# Patient Record
Sex: MALE | Race: WHITE | Employment: FULL TIME | URBAN - METROPOLITAN AREA
[De-identification: names, ages, dates, MRNs, and addresses within clinical notes are randomized per-mention and may not be internally consistent; named-entity substitution may affect disease eponyms.]

---

## 2023-01-14 ENCOUNTER — HOSPITAL ENCOUNTER (OUTPATIENT)
Age: 68
Setting detail: OBSERVATION
Discharge: HOME | DRG: 071 | End: 2023-01-16
Attending: EMERGENCY MEDICINE | Admitting: FAMILY MEDICINE
Payer: COMMERCIAL

## 2023-01-14 ENCOUNTER — APPOINTMENT (OUTPATIENT)
Dept: CT IMAGING | Age: 68
DRG: 071 | End: 2023-01-14
Attending: EMERGENCY MEDICINE
Payer: COMMERCIAL

## 2023-01-14 DIAGNOSIS — R40.4 TRANSIENT ALTERATION OF AWARENESS: Primary | ICD-10-CM

## 2023-01-14 DIAGNOSIS — I48.3 TYPICAL ATRIAL FLUTTER (HCC): ICD-10-CM

## 2023-01-14 PROBLEM — I48.92 ATRIAL FLUTTER (HCC): Status: ACTIVE | Noted: 2023-01-14

## 2023-01-14 LAB
ALBUMIN SERPL-MCNC: 4.2 G/DL (ref 3.5–5)
ALBUMIN/GLOB SERPL: 1.2 (ref 1.1–2.2)
ALP SERPL-CCNC: 89 U/L (ref 45–117)
ALT SERPL-CCNC: 105 U/L (ref 12–78)
ANION GAP SERPL CALC-SCNC: 7 MMOL/L (ref 5–15)
AST SERPL-CCNC: 37 U/L (ref 15–37)
BASOPHILS # BLD: 0 K/UL (ref 0–0.1)
BASOPHILS NFR BLD: 0 % (ref 0–1)
BILIRUB SERPL-MCNC: 0.6 MG/DL (ref 0.2–1)
BUN SERPL-MCNC: 18 MG/DL (ref 6–20)
BUN/CREAT SERPL: 16 (ref 12–20)
CALCIUM SERPL-MCNC: 8.9 MG/DL (ref 8.5–10.1)
CHLORIDE SERPL-SCNC: 105 MMOL/L (ref 97–108)
CO2 SERPL-SCNC: 29 MMOL/L (ref 21–32)
CREAT SERPL-MCNC: 1.16 MG/DL (ref 0.7–1.3)
DIFFERENTIAL METHOD BLD: ABNORMAL
EOSINOPHIL # BLD: 0.2 K/UL (ref 0–0.4)
EOSINOPHIL NFR BLD: 2 % (ref 0–7)
ERYTHROCYTE [DISTWIDTH] IN BLOOD BY AUTOMATED COUNT: 12.3 % (ref 11.5–14.5)
GLOBULIN SER CALC-MCNC: 3.5 G/DL (ref 2–4)
GLUCOSE BLD STRIP.AUTO-MCNC: 97 MG/DL (ref 65–117)
GLUCOSE SERPL-MCNC: 103 MG/DL (ref 65–100)
HCT VFR BLD AUTO: 42.5 % (ref 36.6–50.3)
HGB BLD-MCNC: 14.3 G/DL (ref 12.1–17)
IMM GRANULOCYTES # BLD AUTO: 0 K/UL (ref 0–0.04)
IMM GRANULOCYTES NFR BLD AUTO: 0 % (ref 0–0.5)
INR PPP: 1.1 (ref 0.9–1.1)
LYMPHOCYTES # BLD: 1.7 K/UL (ref 0.8–3.5)
LYMPHOCYTES NFR BLD: 23 % (ref 12–49)
MCH RBC QN AUTO: 30.4 PG (ref 26–34)
MCHC RBC AUTO-ENTMCNC: 33.6 G/DL (ref 30–36.5)
MCV RBC AUTO: 90.2 FL (ref 80–99)
MONOCYTES # BLD: 0.6 K/UL (ref 0–1)
MONOCYTES NFR BLD: 8 % (ref 5–13)
NEUTS SEG # BLD: 4.7 K/UL (ref 1.8–8)
NEUTS SEG NFR BLD: 66 % (ref 32–75)
NRBC # BLD: 0 K/UL (ref 0–0.01)
NRBC BLD-RTO: 0 PER 100 WBC
PLATELET # BLD AUTO: 72 K/UL (ref 150–400)
PMV BLD AUTO: 11.2 FL (ref 8.9–12.9)
POTASSIUM SERPL-SCNC: 4.1 MMOL/L (ref 3.5–5.1)
PROT SERPL-MCNC: 7.7 G/DL (ref 6.4–8.2)
PROTHROMBIN TIME: 10.4 SEC (ref 9–11.1)
RBC # BLD AUTO: 4.71 M/UL (ref 4.1–5.7)
SERVICE CMNT-IMP: NORMAL
SODIUM SERPL-SCNC: 141 MMOL/L (ref 136–145)
WBC # BLD AUTO: 7.2 K/UL (ref 4.1–11.1)

## 2023-01-14 PROCEDURE — 85025 COMPLETE CBC W/AUTO DIFF WBC: CPT

## 2023-01-14 PROCEDURE — 94762 N-INVAS EAR/PLS OXIMTRY CONT: CPT

## 2023-01-14 PROCEDURE — 85610 PROTHROMBIN TIME: CPT

## 2023-01-14 PROCEDURE — 70498 CT ANGIOGRAPHY NECK: CPT

## 2023-01-14 PROCEDURE — 70450 CT HEAD/BRAIN W/O DYE: CPT

## 2023-01-14 PROCEDURE — 65270000046 HC RM TELEMETRY

## 2023-01-14 PROCEDURE — 99285 EMERGENCY DEPT VISIT HI MDM: CPT

## 2023-01-14 PROCEDURE — 93005 ELECTROCARDIOGRAM TRACING: CPT

## 2023-01-14 PROCEDURE — 82962 GLUCOSE BLOOD TEST: CPT

## 2023-01-14 PROCEDURE — 80053 COMPREHEN METABOLIC PANEL: CPT

## 2023-01-14 PROCEDURE — 74011000636 HC RX REV CODE- 636: Performed by: EMERGENCY MEDICINE

## 2023-01-14 PROCEDURE — G0378 HOSPITAL OBSERVATION PER HR: HCPCS

## 2023-01-14 PROCEDURE — 36415 COLL VENOUS BLD VENIPUNCTURE: CPT

## 2023-01-14 RX ORDER — LEVOTHYROXINE SODIUM 175 UG/1
175 TABLET ORAL DAILY
COMMUNITY
Start: 2022-12-11

## 2023-01-14 RX ADMIN — IOPAMIDOL 100 ML: 755 INJECTION, SOLUTION INTRAVENOUS at 19:00

## 2023-01-14 NOTE — ED TRIAGE NOTES
Patient arrives ambulatory with c/o altered mental status that started about 40 minutes ago after intercourse. Patient went to take a shower after and was supposed to go out with his wife and was asking wife after the shower why she was dressed up. Patient A&O x3 (person, place, situation) did not know the month or year, but knew the president.

## 2023-01-14 NOTE — ED PROVIDER NOTES
57-year-old male with PMHx of hypothyroidism presents emergency department with wife for evaluation of sudden onset confusion 45 minutes prior to arrival.  Patient's wife notes that patient is cognitively normal at baseline and approximately 10 minutes after having intercourse 1 hour ago, he began acting very confused, with short-term memory loss, not knowing basic information like his birthday or that they had just discussed meeting up with her friends for dinner tonight. She feels that his speech is slow and that he is having some word finding difficulty. They note that they drink socially and had 3-4 drinks last night but nothing today. They deny any other illicit substance use. The history is provided by the patient and the spouse. Altered mental status   Associated symptoms include confusion. Functional status baseline:  [EPIC#1537^NOTE}      History reviewed. No pertinent past medical history. History reviewed. No pertinent surgical history. History reviewed. No pertinent family history.     Social History     Socioeconomic History    Marital status: Not on file     Spouse name: Not on file    Number of children: Not on file    Years of education: Not on file    Highest education level: Not on file   Occupational History    Not on file   Tobacco Use    Smoking status: Never     Passive exposure: Never    Smokeless tobacco: Never   Vaping Use    Vaping Use: Never used   Substance and Sexual Activity    Alcohol use: Yes    Drug use: Never    Sexual activity: Yes     Partners: Female   Other Topics Concern    Not on file   Social History Narrative    Not on file     Social Determinants of Health     Financial Resource Strain: Not on file   Food Insecurity: Not on file   Transportation Needs: Not on file   Physical Activity: Not on file   Stress: Not on file   Social Connections: Not on file   Intimate Partner Violence: Not on file   Housing Stability: Not on file         ALLERGIES: Patient has no known allergies. Review of Systems   Constitutional: Negative. HENT: Negative. Eyes: Negative. Respiratory: Negative. Cardiovascular: Negative. Gastrointestinal: Negative. Endocrine: Negative. Genitourinary: Negative. Musculoskeletal: Negative. Skin: Negative. Neurological: Negative. Psychiatric/Behavioral:  Positive for confusion. Vitals:    01/14/23 1831   BP: (!) 180/103   Pulse: 95   Resp: 16   SpO2: 98%   Weight: 104.5 kg (230 lb 6.1 oz)   Height: 6' 4\" (1.93 m)            Physical Exam  Vitals and nursing note reviewed. Constitutional:       General: He is not in acute distress. Appearance: Normal appearance. He is not ill-appearing. HENT:      Head: Normocephalic and atraumatic. Nose: Nose normal.      Mouth/Throat:      Mouth: Mucous membranes are moist.   Eyes:      General: No visual field deficit. Extraocular Movements: Extraocular movements intact. Pupils: Pupils are equal, round, and reactive to light. Cardiovascular:      Rate and Rhythm: Normal rate. Rhythm irregular. Pulses: Normal pulses. Pulmonary:      Effort: Pulmonary effort is normal. No respiratory distress. Breath sounds: Normal breath sounds. Abdominal:      General: There is no distension. Palpations: Abdomen is soft. Tenderness: There is no abdominal tenderness. Musculoskeletal:         General: Normal range of motion. Cervical back: Normal range of motion. Skin:     General: Skin is warm and dry. Neurological:      General: No focal deficit present. Mental Status: He is alert. He is disoriented. GCS: GCS eye subscore is 4. GCS verbal subscore is 4. GCS motor subscore is 6. Cranial Nerves: No cranial nerve deficit or facial asymmetry. Sensory: No sensory deficit. Motor: No weakness. Comments: Notable findings from neurologic exam include mild speech delay and possible word finding difficulty.   No dysarthria appreciated, patient is slow to answer basic questions. Additionally he is disoriented to the month, although he knows the year, in addition to person and place. Psychiatric:         Mood and Affect: Mood normal.        Medical Decision Making  DDx: CVA, TIA, hypertensive emergency, infection, sepsis, UTI, pneumonia, viral illness, intracranial hemorrhage, electrolyte abnormality, toxic ingestion, withdrawal, ACS, hyperammonia    Patient's presentation is concerning given his normal neurologic baseline and acute change in mentation. Additionally EKG done immediately upon arrival demonstrates a flutter, which patient is not known to have had in the past. Also his blood pressure is  hypertensive encephalopathy is possible. Given the word finding difficulty and the acuity of his confusion, CVA is under consideration. A level 1 code stroke was called. We will pursue a broad work-up for altered mental status. Plan:  - Labs: CBC, CMP, troponin, UA, ammonia  - Imaging: CTH, CXR  - Consults: Teleneurology    EKG: This EKG was interpreted by me at 1840. Regularly irregular rhythm consistent with atrial flutter and a generally 3:1 conduction pattern. Normal axis. Some ST elevations are present in V3 through V6, though not meeting STEMI criteria    Reassessment: Patient's work-up is thus far notable for the new a flutter but otherwise reassuring blood work and imaging. Spoke with the telemetry neurologist who had interviewed patient approximately 15 minutes after me and she states that at this time his mental status and word finding seemed completely normal.  I spoke with the patient and his wife shortly thereafter and agree that his confusion appears to have resolved. The neurologist recommends admission to make sure that his confusion does not return and for an MRI. I would also like to admit for work-up of his new a flutter.   Etiology of his confusion remains unclear, however hypertensive encephalopathy may have contributed. Repeat pressures have improved. Will admit to the hospitalist service at [address] [place] [address] for Admission  7:57 PM    ED Room Number: SER02/02  Patient Name and age:  Marco Villeda 75 y.o.  male  Working Diagnosis: Confusion, New atrial flutter    COVID-19 Suspicion:  no  Sepsis present:  no  Reassessment needed: no  Code Status:  Full Code  Readmission: no  Isolation Requirements:  no  Recommended Level of Care:  telemetry  Department:[facility] Adult ED - 21   Other:  59-year-old male with PMHx of hypothyroidism presents emergency department with wife for evaluation of sudden onset confusion 45 minutes prior to arrival.  Patient's wife notes that patient is cognitively normal at baseline and approximately 10 minutes after having intercourse 1 hour ago, he began acting very confused, with short-term memory loss, not knowing basic information like his birthday or that they had just discussed meeting up with her friends for dinner tonight. She feels that his speech is slow and that he is having some word finding difficulty. Seen by telemetry neurologist, who would like admitted for AMS work-up, including MRI. Also found to have new atrial flutter in the 80s. Onset of this is unclear, will elect not to cardiovert. Would like to admit for completion of AMS work-up and cardiac monitoring for new a flutter. Amount and/or Complexity of Data Reviewed  Independent Historian: spouse  Labs: ordered. Radiology: ordered. ECG/medicine tests: ordered and independent interpretation performed. Decision-making details documented in ED Course. Risk  Prescription drug management. Decision regarding hospitalization.            Procedures

## 2023-01-15 ENCOUNTER — APPOINTMENT (OUTPATIENT)
Dept: MRI IMAGING | Age: 68
DRG: 071 | End: 2023-01-15
Attending: FAMILY MEDICINE
Payer: COMMERCIAL

## 2023-01-15 LAB
AMPHET UR QL SCN: NEGATIVE
ANION GAP SERPL CALC-SCNC: 4 MMOL/L (ref 5–15)
ATRIAL RATE: 277 BPM
BARBITURATES UR QL SCN: NEGATIVE
BASOPHILS # BLD: 0.1 K/UL (ref 0–0.1)
BASOPHILS NFR BLD: 1 % (ref 0–1)
BENZODIAZ UR QL: NEGATIVE
BUN SERPL-MCNC: 15 MG/DL (ref 6–20)
BUN/CREAT SERPL: 13 (ref 12–20)
CALCIUM SERPL-MCNC: 8.6 MG/DL (ref 8.5–10.1)
CALCULATED P AXIS, ECG09: -94 DEGREES
CALCULATED R AXIS, ECG10: 48 DEGREES
CALCULATED T AXIS, ECG11: 19 DEGREES
CANNABINOIDS UR QL SCN: NEGATIVE
CHLORIDE SERPL-SCNC: 106 MMOL/L (ref 97–108)
CO2 SERPL-SCNC: 30 MMOL/L (ref 21–32)
COCAINE UR QL SCN: NEGATIVE
CREAT SERPL-MCNC: 1.15 MG/DL (ref 0.7–1.3)
DIAGNOSIS, 93000: NORMAL
DIFFERENTIAL METHOD BLD: ABNORMAL
DRUG SCRN COMMENT,DRGCM: NORMAL
EOSINOPHIL # BLD: 0.2 K/UL (ref 0–0.4)
EOSINOPHIL NFR BLD: 3 % (ref 0–7)
ERYTHROCYTE [DISTWIDTH] IN BLOOD BY AUTOMATED COUNT: 12.3 % (ref 11.5–14.5)
EST. AVERAGE GLUCOSE BLD GHB EST-MCNC: 111 MG/DL
ETHANOL SERPL-MCNC: <10 MG/DL
GLUCOSE SERPL-MCNC: 106 MG/DL (ref 65–100)
HBA1C MFR BLD: 5.5 % (ref 4–5.6)
HCT VFR BLD AUTO: 44.2 % (ref 36.6–50.3)
HGB BLD-MCNC: 14.3 G/DL (ref 12.1–17)
IMM GRANULOCYTES # BLD AUTO: 0 K/UL (ref 0–0.04)
IMM GRANULOCYTES NFR BLD AUTO: 0 % (ref 0–0.5)
LYMPHOCYTES # BLD: 1.5 K/UL (ref 0.8–3.5)
LYMPHOCYTES NFR BLD: 27 % (ref 12–49)
MAGNESIUM SERPL-MCNC: 2.2 MG/DL (ref 1.6–2.4)
MCH RBC QN AUTO: 29.7 PG (ref 26–34)
MCHC RBC AUTO-ENTMCNC: 32.4 G/DL (ref 30–36.5)
MCV RBC AUTO: 91.7 FL (ref 80–99)
METHADONE UR QL: NEGATIVE
MONOCYTES # BLD: 0.6 K/UL (ref 0–1)
MONOCYTES NFR BLD: 11 % (ref 5–13)
NEUTS SEG # BLD: 3.1 K/UL (ref 1.8–8)
NEUTS SEG NFR BLD: 58 % (ref 32–75)
NRBC # BLD: 0 K/UL (ref 0–0.01)
NRBC BLD-RTO: 0 PER 100 WBC
OPIATES UR QL: NEGATIVE
PCP UR QL: NEGATIVE
PLATELET # BLD AUTO: 68 K/UL (ref 150–400)
PMV BLD AUTO: 10.8 FL (ref 8.9–12.9)
POTASSIUM SERPL-SCNC: 4.5 MMOL/L (ref 3.5–5.1)
Q-T INTERVAL, ECG07: 394 MS
QRS DURATION, ECG06: 92 MS
QTC CALCULATION (BEZET), ECG08: 462 MS
RBC # BLD AUTO: 4.82 M/UL (ref 4.1–5.7)
RBC MORPH BLD: ABNORMAL
SODIUM SERPL-SCNC: 140 MMOL/L (ref 136–145)
T4 FREE SERPL-MCNC: 1.2 NG/DL (ref 0.8–1.5)
TSH SERPL DL<=0.05 MIU/L-ACNC: 0.23 UIU/ML (ref 0.36–3.74)
VENTRICULAR RATE, ECG03: 83 BPM
WBC # BLD AUTO: 5.5 K/UL (ref 4.1–11.1)

## 2023-01-15 PROCEDURE — 96374 THER/PROPH/DIAG INJ IV PUSH: CPT

## 2023-01-15 PROCEDURE — 85025 COMPLETE CBC W/AUTO DIFF WBC: CPT

## 2023-01-15 PROCEDURE — 74011000250 HC RX REV CODE- 250: Performed by: FAMILY MEDICINE

## 2023-01-15 PROCEDURE — 83036 HEMOGLOBIN GLYCOSYLATED A1C: CPT

## 2023-01-15 PROCEDURE — 74011000258 HC RX REV CODE- 258: Performed by: FAMILY MEDICINE

## 2023-01-15 PROCEDURE — 93005 ELECTROCARDIOGRAM TRACING: CPT

## 2023-01-15 PROCEDURE — 70551 MRI BRAIN STEM W/O DYE: CPT

## 2023-01-15 PROCEDURE — 74011250637 HC RX REV CODE- 250/637: Performed by: STUDENT IN AN ORGANIZED HEALTH CARE EDUCATION/TRAINING PROGRAM

## 2023-01-15 PROCEDURE — 83735 ASSAY OF MAGNESIUM: CPT

## 2023-01-15 PROCEDURE — 82077 ASSAY SPEC XCP UR&BREATH IA: CPT

## 2023-01-15 PROCEDURE — 80307 DRUG TEST PRSMV CHEM ANLYZR: CPT

## 2023-01-15 PROCEDURE — 84443 ASSAY THYROID STIM HORMONE: CPT

## 2023-01-15 PROCEDURE — G0378 HOSPITAL OBSERVATION PER HR: HCPCS

## 2023-01-15 PROCEDURE — 80048 BASIC METABOLIC PNL TOTAL CA: CPT

## 2023-01-15 PROCEDURE — 74011250636 HC RX REV CODE- 250/636: Performed by: FAMILY MEDICINE

## 2023-01-15 PROCEDURE — 84480 ASSAY TRIIODOTHYRONINE (T3): CPT

## 2023-01-15 PROCEDURE — 36415 COLL VENOUS BLD VENIPUNCTURE: CPT

## 2023-01-15 PROCEDURE — 84439 ASSAY OF FREE THYROXINE: CPT

## 2023-01-15 PROCEDURE — 65270000046 HC RM TELEMETRY

## 2023-01-15 RX ORDER — ENOXAPARIN SODIUM 100 MG/ML
40 INJECTION SUBCUTANEOUS DAILY
Status: DISCONTINUED | OUTPATIENT
Start: 2023-01-15 | End: 2023-01-15

## 2023-01-15 RX ORDER — ONDANSETRON 4 MG/1
4 TABLET, ORALLY DISINTEGRATING ORAL
Status: DISCONTINUED | OUTPATIENT
Start: 2023-01-15 | End: 2023-01-16 | Stop reason: HOSPADM

## 2023-01-15 RX ORDER — SODIUM CHLORIDE 0.9 % (FLUSH) 0.9 %
5-40 SYRINGE (ML) INJECTION AS NEEDED
Status: DISCONTINUED | OUTPATIENT
Start: 2023-01-15 | End: 2023-01-16 | Stop reason: HOSPADM

## 2023-01-15 RX ORDER — ACETAMINOPHEN 650 MG/1
650 SUPPOSITORY RECTAL
Status: DISCONTINUED | OUTPATIENT
Start: 2023-01-15 | End: 2023-01-16 | Stop reason: HOSPADM

## 2023-01-15 RX ORDER — DILTIAZEM HYDROCHLORIDE 30 MG/1
30 TABLET, FILM COATED ORAL
Status: DISCONTINUED | OUTPATIENT
Start: 2023-01-15 | End: 2023-01-16 | Stop reason: HOSPADM

## 2023-01-15 RX ORDER — ACETAMINOPHEN 325 MG/1
650 TABLET ORAL
Status: DISCONTINUED | OUTPATIENT
Start: 2023-01-15 | End: 2023-01-16 | Stop reason: HOSPADM

## 2023-01-15 RX ORDER — ONDANSETRON 2 MG/ML
4 INJECTION INTRAMUSCULAR; INTRAVENOUS
Status: DISCONTINUED | OUTPATIENT
Start: 2023-01-15 | End: 2023-01-16 | Stop reason: HOSPADM

## 2023-01-15 RX ORDER — ENOXAPARIN SODIUM 100 MG/ML
1 INJECTION SUBCUTANEOUS EVERY 12 HOURS
Status: DISCONTINUED | OUTPATIENT
Start: 2023-01-15 | End: 2023-01-16

## 2023-01-15 RX ORDER — SODIUM CHLORIDE 0.9 % (FLUSH) 0.9 %
5-40 SYRINGE (ML) INJECTION EVERY 8 HOURS
Status: DISCONTINUED | OUTPATIENT
Start: 2023-01-15 | End: 2023-01-16 | Stop reason: HOSPADM

## 2023-01-15 RX ORDER — POLYETHYLENE GLYCOL 3350 17 G/17G
17 POWDER, FOR SOLUTION ORAL DAILY PRN
Status: DISCONTINUED | OUTPATIENT
Start: 2023-01-15 | End: 2023-01-16 | Stop reason: HOSPADM

## 2023-01-15 RX ADMIN — THIAMINE HYDROCHLORIDE 500 MG: 100 INJECTION, SOLUTION INTRAMUSCULAR; INTRAVENOUS at 17:00

## 2023-01-15 RX ADMIN — SODIUM CHLORIDE, PRESERVATIVE FREE 10 ML: 5 INJECTION INTRAVENOUS at 21:42

## 2023-01-15 RX ADMIN — SODIUM CHLORIDE, PRESERVATIVE FREE 10 ML: 5 INJECTION INTRAVENOUS at 07:10

## 2023-01-15 RX ADMIN — DILTIAZEM HYDROCHLORIDE 30 MG: 30 TABLET, FILM COATED ORAL at 21:42

## 2023-01-15 RX ADMIN — SODIUM CHLORIDE, PRESERVATIVE FREE 10 ML: 5 INJECTION INTRAVENOUS at 15:08

## 2023-01-15 RX ADMIN — DILTIAZEM HYDROCHLORIDE 30 MG: 30 TABLET, FILM COATED ORAL at 15:06

## 2023-01-15 NOTE — ED NOTES
Bedside and Verbal shift change report given to Yudy Granger RN by Luis Reynoso RN. Report included the following information ED Summary.

## 2023-01-15 NOTE — PROGRESS NOTES
1930  Verbal bedside report given to Mansoor Broussard RN oncoming nurse by Signa Libman. Veronica Bradley RN off-going nurse. Report included current pt status and condition, recent results, hx of present illness, heart rate and rhythm (Aflutter), and respiratory status. 1215  Conducted EKG on pt    1145  Pt arrived back on floor from MRI. 1115  Pt left floor with transport for MRI.    0800  Pt awake and alert, no s/sx of confusion this am.    0730  Verbal bedside report received from Mansoor Broussard RN off-going nurse by Signa Libman. CHANTE Sheridan oncoming nurse. Report included current pt status and condition, recent results, hx of present illness, heart rate and rhythm (Aflutter), and respiratory status. Greeted pt and enquired about present needs and goals for the day.

## 2023-01-15 NOTE — PROGRESS NOTES
Transitions of Care Plan  RUR: 3% - low  Admission Dx: Aflutter w RVR; thrombocytopenia  Consults: Cardiology; Hematology  Baseline: independent without DME; resides w wife  Barriers to Discharge: medical  Disposition: home with wife  Estimated Discharge Date: 1/17/23    Reason for Admission:  Aflutter w RVR                   RUR Score:          3% - low           Plan for utilizing home health:   No       PCP: First and Last name:  None     Name of Practice:    Are you a current patient: Yes/No:    Approximate date of last visit:    Can you participate in a virtual visit with your PCP:                     Current Advanced Directive/Advance Care Plan: Full Code      Healthcare Decision Maker:   Click here to complete [address] including selection of the [address] (ie \"Primary\")           Wife - Adolfo Arzola - p: [phone]                  Transition of Care Plan:                      Patient remains at his independent without DME baseline. Resides at address on file with wife. Patient may need cardioversion prior to discharge. Hematology also consulted for thrombocytopenia. Suni Hewitt, MPH  Care Manager [facility]  Available via [address] [place] [address] or  [facility]    Care Management Interventions  PCP Verified by CM: Yes  Palliative Care Criteria Met (RRAT>21 & CHF Dx)?: No  Mode of Transport at Discharge:  Other (see comment)  Transition of Care Consult (CM Consult): Discharge Planning  MyChart Signup: Yes  Discharge Durable Medical Equipment: No  Health Maintenance Reviewed: Yes  Physical Therapy Consult: No  Occupational Therapy Consult: No  Speech Therapy Consult: No  Support Systems: Spouse/Significant Other  Confirm Follow Up Transport: Self  Discharge Location  Patient Expects to be Discharged to[de-identified] Home

## 2023-01-15 NOTE — PROGRESS NOTES
[facility] Adult  Hospitalist Group                                                                                          Hospitalist Progress Note  River Sanchez MD  Answering service: [phone] OR [phone] from in house phone        Date of Service:  1/15/2023  NAME:  Boris Fragoso  :  1955  MRN:  [id number]      Admission Summary:   Boris Fragoso is a 75 y.o. male with a pmhx Hashimotos disease who presents as a transfer from [facility] for   altered mental status. Per ED record about 40 minutes after intercourse, pt. Went to take a shower, and emerged confused. In the ED, VSS. Lab showed grossly normal.  CT head with no acute intracranial finding, and CTA head/neck with no LVO or hemodynamically significant stenosis. Interval history / Subjective:   Patient seen and examined, reviewed his vitals, labs, and [address]  Cardiology and hematology consulted  Brain MRI ordered  Patient in aflutter with tachycardia     Assessment & Plan:      acute encepahlopathy- resolved- likely a case of transient global amnesia  Brain MRI ordered.  IV thiamine ordered     #Atrial flutter  -tachycardia-  started on PO diltiazem  normal Mg2+ echo ordered  Starting lovenox  -consulted cardiology for eval and tx recs     #Hypothyroidism/ hx of Autoimmune thyroditis  -continue home levothyroxine  -low TSH - normal free t4     Heme/onc- thrombocytopenia; hx ITP; hx of left orchiectomy/testicular cancer  -plt 72- likely due to regular/daily Etoh use  -consult hematology for eval and tx recs  Starting lovenox SQ     Elevated ALT levels- likely related to social Etoh use    Prostatomegaly-monitor for urinary retention       Code status: Full  Prophylaxis: plans for starting lovenox  Care Plan discussed with: patient  Anticipated Disposition: 24-48hrs     Hospital Problems  Never Reviewed            Codes Class Noted POA    Atrial flutter ([facility] [address] ICD-10-CM: H23.62  ICD-9-CM: 427.32  2023 Unknown Review of Systems:   A comprehensive review of systems was negative except for that written in the HPI. Vital Signs:    Last 24hrs VS reviewed since prior progress note. Most recent are:  Visit Vitals  BP (!) 167/105   Pulse 87   Temp 98.2 °F (36.8 °C)   Resp 20   Ht 6' 4\" (1.93 m)   Wt 101.2 kg (223 lb 1.7 oz)   SpO2 99%   BMI 27.16 kg/m²     Patient Vitals for the past 24 hrs:   Temp Pulse Resp BP SpO2   01/15/23 0711 98.2 °F (36.8 °C) 87 20 (!) 167/105 99 %   01/15/23 0600 -- 90 -- -- --   01/15/23 0400 -- 70 -- -- --   01/15/23 0327 98 °F (36.7 °C) 78 20 (!) 163/107 100 %   01/15/23 0200 -- (!) 53 -- -- --   01/15/23 0117 98.4 °F (36.9 °C) 94 21 (!) 179/105 99 %   01/14/23 2151 -- 97 20 -- 98 %   01/14/23 2100 -- 90 16 (!) 161/98 95 %   01/14/23 2045 -- 82 15 -- 97 %   01/14/23 2034 -- (!) 105 -- (!) 169/104 98 %   01/14/23 1911 -- 87 25 (!) 154/108 98 %   01/14/23 1856 -- 94 22 (!) 172/126 97 %   01/14/23 1854 98.2 °F (36.8 °C) 84 17 (!) 159/108 97 %   01/14/23 1831 -- 95 16 (!) 180/103 98 %        Intake/Output Summary (Last 24 hours) at 1/15/2023 0842  Last data filed at 1/15/2023 4902  Gross per 24 hour   Intake --   Output 700 ml   Net -700 ml        Physical Examination:     I had a face to face encounter with this patient and independently examined them on 1/15/2023 as outlined below:          Constitutional:  No acute distress, cooperative, pleasant    ENT:  Oral mucosa moist, oropharynx benign. Resp:  CTA bilaterally. No wheezing/rhonchi/rales. No accessory muscle use. CV:  IRR, tachycardia, no murmurs, gallops, rubs    GI:  Soft, non distended, non tender. normoactive bowel sounds, no hepatosplenomegaly     Musculoskeletal:  No edema, warm, 2+ pulses throughout    Neurologic:  Moves all extremities.   AAOx3, CN II-XII reviewed            Data Review:    Review and/or order of clinical lab test  Review and/or order of tests in the radiology section of CPT  Review and/or order of tests in the medicine section of CPT    CT Results  (Last 48 hours)                 01/14/23 1850  CTA CODE NEURO HEAD AND NECK W CONT Final result    Impression:  No large vessel occlusion or hemodynamically significant stenosis. Narrative:  EXAM: CTA CODE NEURO HEAD AND NECK W CONT       INDICATION: Code Stroke       COMPARISON: None. CONTRAST: 100 mL of Isovue-370. TECHNIQUE:  Unenhanced  images were obtained to localize the volume for   acquisition. Multislice helical axial CT angiography was performed from the   aortic arch to the top of the head during uneventful rapid bolus intravenous   contrast administration. Coronal and sagittal reformations and 3D post   processing was performed. CT dose reduction was achieved through use of a   standardized protocol tailored for this examination and automatic exposure   control for dose modulation. FINDINGS:       DELAYED ENHANCEMENT HEAD CT:    No intra or extra-axial mass or collection. Ventricles are normal in size and   configuration. The basal cisterns are patent. Dural venous sinuses are patent. No abnormal parenchymal or meningeal   enhancement. Mastoid air cells and paranasal sinuses are clear. CTA NECK:   Great vessels: Normal arch anatomy with the origins patent. Right subclavian artery: Patent   Left subclavian artery: Patent    Right common carotid artery: Patent    Left common carotid artery: Patent    Cervical right internal carotid artery: Patent with no significant stenosis by   NASCET criteria. Cervical left internal carotid artery: Patent with no significant stenosis by   NASCET criteria. Right vertebral artery: Patent   Left vertebral artery: Patent   The lung apices are clear. The thyroid is homogeneous. No cervical   lymphadenopathy.        CTA HEAD:   Right cavernous internal carotid artery: Patent   Left cavernous internal carotid artery: Patent   Anterior cerebral arteries: Patent   Anterior communicating artery: Patent   Right middle cerebral artery: Patent   Left middle cerebral artery: Patent   Posterior communicating arteries: Patent   Posterior cerebral arteries: Patent   Basilar artery: Patent   Distal vertebral arteries: Patent   No evidence for intracranial aneurysm or hemodynamically significant stenosis. 01/14/23 1837  CT CODE NEURO HEAD WO CONTRAST Final result    Impression:  No acute process seen       Narrative:  EXAM: CT CODE NEURO HEAD WO CONTRAST       INDICATION: Code Stroke       COMPARISON: None. TECHNIQUE: Unenhanced CT of the head was performed using 5 mm images. Brain and   bone windows were generated. CT dose reduction was achieved through use of a   standardized protocol tailored for this examination and automatic exposure   control for dose modulation. FINDINGS:   The ventricles and sulci are normal in size, shape and configuration and   midline. There is no significant white matter disease. There is no intracranial   hemorrhage, extra-axial collection, mass, mass effect or midline shift. The   basilar cisterns are open. No acute infarct is identified. The bone windows   demonstrate no abnormalities. The visualized portions of the paranasal sinuses   and mastoid air cells are clear. Labs:     Recent Labs     01/15/23  0552 01/14/23 1827   WBC 5.5 7.2   HGB 14.3 14.3   HCT 44.2 42.5   PLT 68* 72*     Recent Labs     01/15/23  0552 01/14/23 1827    141   K 4.5 4.1    105   CO2 30 29   BUN 15 18   CREA 1.15 1.16   * 103*   CA 8.6 8.9   MG 2.2  --      Recent Labs     01/14/23 1827   *   AP 89   TBILI 0.6   TP 7.7   ALB 4.2   GLOB 3.5     Recent Labs     01/14/23 1827   INR 1.1   PTP 10.4      No results for input(s): FE, TIBC, PSAT, FERR in the last 72 hours. No results found for: FOL, RBCF   No results for input(s): PH, PCO2, PO2 in the last 72 hours.   No results for input(s): CPK, CKNDX, TROIQ in the last 72 hours.     No lab exists for component: CPKMB  No results found for: CHOL, CHOLX, CHLST, CHOLV, HDL, HDLP, LDL, LDLC, DLDLP, TGLX, TRIGL, TRIGP, CHHD, CHHDX  Lab Results   Component Value Date/Time    Glucose (POC) 97 01/14/2023 06:21 PM     No results found for: COLOR, APPRN, SPGRU, REFSG, ALBA, PROTU, GLUCU, KETU, BILU, UROU, VICENTE, LEUKU, GLUKE, EPSU, BACTU, WBCU, RBCU, CASTS, UCRY      Medications Reviewed:     Current Facility-Administered Medications   Medication Dose Route Frequency    sodium chloride (NS) flush 5-40 mL  5-40 mL IntraVENous Q8H    sodium chloride (NS) flush 5-40 mL  5-40 mL IntraVENous PRN    acetaminophen (TYLENOL) tablet 650 mg  650 mg Oral Q6H PRN    Or    acetaminophen (TYLENOL) suppository 650 mg  650 mg Rectal Q6H PRN    polyethylene glycol (MIRALAX) packet 17 g  17 g Oral DAILY PRN    ondansetron (ZOFRAN ODT) tablet 4 mg  4 mg Oral Q8H PRN    Or    ondansetron (ZOFRAN) injection 4 mg  4 mg IntraVENous Q6H PRN    enoxaparin (LOVENOX) injection 100 mg  1 mg/kg SubCUTAneous Q12H     ______________________________________________________________________  EXPECTED LENGTH OF STAY: - - -  ACTUAL LENGTH OF STAY:          1                 Saman Dwyer MD

## 2023-01-15 NOTE — CONSULTS
[address] [facility] Cardiology Consultation    Date of Consult:  01/15/23  Date of Admission: 1/14/2023  Primary Cardiologist: none  Physician Requesting consult: Dr. Prisca Holland    Assessment/Recommendations:  Atrial flutter with RVR  - new diagnosis, unknown duration  - repeat ecg  - obtain echo  - start diltiazem PO  - will benefit from cardioversion but given thrombocytopenia will defer anticoagulation and cardioversion until patient is evaluated by hematology and whether it is safe to antcoagulate. CHADSVASc score 2 so anticoagulation is generally indicated    HTN  - diltiazem as above    3. Thrombocytopenia with h/o ITP - hematology consult pending    4. Hypothyroidism - TSH low but FT4 normal.    Thank you for the opportunity to participate in the care of Nena Pitt and please do not hesitate to contact us should you have any questions. Chief Complaint / Reason for Consult:   AFL    History of Present Illness:  Nena Pitt is a 75 y.o. male with the below listed medical history who was admitted with AFL. Yesterday evening he had an episode of confusion and could not recall the  events yesterday afternoon which prompted coming to the ER. He was incidentally noted to be in AFL in ER. For the past month he has noted mild decrease in exercise tolerance and palpitations but no chest pain, orthopnea, PND, presyncope, or syncope. No cardiac history. PMH:  - ITP requiring biologics previously. No major bleed  - Hashimoto's  - H/o testicular cancer s/p orchiectomy  - No h/o DM  or stroke. PSH: orchiectomy  Social: no tobacco ,social etoh, no drugs    Past Medical History:   Diagnosis Date    Hashimoto's disease        Prior to Admission medications    Medication Sig Start Date End Date Taking? Authorizing Provider   Synthroid 175 mcg tablet Take 175 mcg by mouth daily.  12/11/22   Other, MD Dashawn       Current Facility-Administered Medications   Medication Dose Route Frequency    sodium chloride (NS) flush 5-40 mL 5-40 mL IntraVENous Q8H    sodium chloride (NS) flush 5-40 mL  5-40 mL IntraVENous PRN    acetaminophen (TYLENOL) tablet 650 mg  650 mg Oral Q6H PRN    Or    acetaminophen (TYLENOL) suppository 650 mg  650 mg Rectal Q6H PRN    polyethylene glycol (MIRALAX) packet 17 g  17 g Oral DAILY PRN    ondansetron (ZOFRAN ODT) tablet 4 mg  4 mg Oral Q8H PRN    Or    ondansetron (ZOFRAN) injection 4 mg  4 mg IntraVENous Q6H PRN    enoxaparin (LOVENOX) injection 100 mg  1 mg/kg SubCUTAneous Q12H       History reviewed. No pertinent family history. No family h/o SCD or premature CAD    Social History     Socioeconomic History    Marital status:      Spouse name: Not on file    Number of children: Not on file    Years of education: Not on file    Highest education level: Not on file   Occupational History    Not on file   Tobacco Use    Smoking status: Never     Passive exposure: Never    Smokeless tobacco: Never   Vaping Use    Vaping Use: Never used   Substance and Sexual Activity    Alcohol use: Yes    Drug use: Never    Sexual activity: Yes     Partners: Female   Other Topics Concern    Not on file   Social History Narrative    Not on file     Social Determinants of Health     Financial Resource Strain: Not on file   Food Insecurity: Not on file   Transportation Needs: Not on file   Physical Activity: Not on file   Stress: Not on file   Social Connections: Not on file   Intimate Partner Violence: Not on file   Housing Stability: Not on file       ROS  ROS: All other systems reviewed and were negative other than mentioned above.      Visit Vitals  BP (!) 167/105   Pulse 87   Temp 98.2 °F (36.8 °C)   Resp 20   Ht 6' 4\" (1.93 m)   Wt 101.2 kg (223 lb 1.7 oz)   SpO2 99%   BMI 27.16 kg/m²         Intake/Output Summary (Last 24 hours) at 1/15/2023 0816  Last data filed at 1/15/2023 0717  Gross per 24 hour   Intake --   Output 700 ml   Net -700 ml        General: resting comfortably in no distress  HEENT: sclera anicteric, moist mucous membranes  Neck: supple, no JVD   CV: irregular rate and rhythm, no murmurs, rubs or gallops, 2+ radial pulses bilaterally  Lungs: no respiratory distress, lungs clear to auscultation without wheezing or rales  Abdomen: + bowel sounds, soft, non distended, non tender  MSK: no lower extremity edema  Skin: warm to touch  Neuro: alert and oriented, answered questions appropriately  Psych: normal mood and affect     Lab Review:  BMP:   Lab Results   Component Value Date/Time     01/15/2023 05:52 AM    K 4.5 01/15/2023 05:52 AM     01/15/2023 05:52 AM    CO2 30 01/15/2023 05:52 AM    AGAP 4 (L) 01/15/2023 05:52 AM     (H) 01/15/2023 05:52 AM    BUN 15 01/15/2023 05:52 AM    CREA 1.15 01/15/2023 05:52 AM        CBC:  Lab Results   Component Value Date/Time    WBC 5.5 01/15/2023 05:52 AM    HGB 14.3 01/15/2023 05:52 AM    HCT 44.2 01/15/2023 05:52 AM    PLATELET 68 (L) 00/78/7696 05:52 AM    MCV 91.7 01/15/2023 05:52 AM       All Cardiac Markers in the last 24 hours:      Data Review:  ECG tracing personally reviewed: unable to locate ECG from ER. Repeat ordered  Tele: Atrial flutter with HR up to 120s    Echocardiogram: pending      Signed:  John Zamora, [address] [place] [address] [facility]  01/15/23

## 2023-01-15 NOTE — H&P
[facility]  History and Physical    Date of Service:  1/15/2023  Primary Care Provider: None  Source of information: Chart review    Chief Complaint: Altered mental status      History of Presenting Illness:   Dhara Singleton is a 75 y.o. male with a pmhx Hashimotos disease who presents as a transfer from [address] [place] [address] for   altered mental status. Per ED record about 40 minutes after intercourse, pt. Went to take a shower, and emerged confused. In the ED, VSS. Lab showed grossly normal.  CT head with no acute intracranial finding, and CTA head/neck with no LVO or hemodynamically significant stenosis. REVIEW OF SYSTEMS:  A comprehensive review of systems was negative except for that written in the History of Present Illness. Past Medical History:   Diagnosis Date    Hashimoto's disease       History reviewed. No pertinent surgical history. Prior to Admission medications    Medication Sig Start Date End Date Taking? Authorizing Provider   Synthroid 175 mcg tablet Take 175 mcg by mouth daily. 12/11/22   Other, MD Dashawn     No Known Allergies   History reviewed. No pertinent family history. Social History:  reports that he has never smoked. He has never been exposed to tobacco smoke. He has never used smokeless tobacco. He reports current alcohol use. He reports that he does not use drugs.    Social Determinants of Health     Tobacco Use: Low Risk     Smoking Tobacco Use: Never    Smokeless Tobacco Use: Never    Passive Exposure: Never   Alcohol Use: Not on file   Financial Resource Strain: Not on file   Food Insecurity: Not on file   Transportation Needs: Not on file   Physical Activity: Not on file   Stress: Not on file   Social Connections: Not on file   Intimate Partner Violence: Not on file   Depression: Not on file   Housing Stability: Not on file        Family and social history were personally reviewed, all pertinent and relevant details are outlined as above.    Objective:   Visit Vitals  BP (!) 163/107 (BP 1 Location: Right upper arm, BP Patient Position: Lying)   Pulse 78   Temp 98 °F (36.7 °C)   Resp 20   Ht 6' 4\" (1.93 m)   Wt 101.2 kg (223 lb 1.7 oz)   SpO2 100%   BMI 27.16 kg/m²      O2 Device: None (Room air)    PHYSICAL EXAM:   General: Alert x oriented x 3, awake, no acute distress, resting in bed, pleasant male, appears to be stated age  HEENT: PEERL, EOMI, moist mucus membranes  Neck: Supple, no JVD, no meningeal signs  Chest: Clear to auscultation bilaterally   CVS: RRR, S1 S2 heard, no murmurs/rubs/gallops  Abd: Soft, non-tender, non-distended, +bowel sounds   Ext: No clubbing, no cyanosis, no edema  Neuro/Psych: Pleasant mood and affect, CN 2-12 grossly intact, sensory grossly within normal limit, Strength 5/5 in all extremities,   Cap refill: Brisk, less than 3 seconds  Pulses: 2+ radial pulses  Skin: Warm, dry, without rashes or lesions    Data Review: All diagnostic labs and studies have been reviewed. Abnormal Labs Reviewed   CBC WITH AUTOMATED DIFF - Abnormal; Notable for the following components:       Result Value    PLATELET 72 (*)     All other components within normal limits   METABOLIC PANEL, COMPREHENSIVE - Abnormal; Notable for the following components:    Glucose 103 (*)     ALT (SGPT) 105 (*)     All other components within normal limits       All Micro Results       None            IMAGING:   CTA CODE NEURO HEAD AND NECK W CONT   Final Result   No large vessel occlusion or hemodynamically significant stenosis. CT CODE NEURO HEAD WO CONTRAST   Final Result   No acute process seen           ECG/ECHO:  No results found for this or any previous visit. Assessment:   Given the patient's current clinical presentation, there is a high level of concern for decompensation if discharged from the emergency department.  Complex decision making was performed, which includes reviewing the patient's available past medical records, laboratory results, and imaging studies. Active Problems:    Atrial flutter ([address] (1/14/2023)        Plan:     #Atrial flutter  -rate controlled   -check TSH, and Mg2+  -echo   -consult cardiology    #Hypothyroidism  -continue home levothyroxine  -check TSH as per above    #thrombocytopenia  #hx ITP  -plt 72  -consult hematology    DIET: ADULT DIET Regular   ISOLATION PRECAUTIONS: There are currently no Active Isolations  CODE STATUS: Full Code   DVT PROPHYLAXIS: Lovenox  FUNCTIONAL STATUS PRIOR TO HOSPITALIZATION: Fully active and ambulatory; able to carry on all self-care without restriction. Ambulatory status/function: By self   EARLY MOBILITY ASSESSMENT: Recommend routine ambulation while hospitalized with the assistance of nursing staff  ANTICIPATED DISCHARGE: 24-48 hours. ANTICIPATED DISPOSITION: Home  EMERGENCY CONTACT/SURROGATE DECISION MAKER: Georgie Guerrero (spouse)    Signed By: Jose Ramon Carter MD     January 15, 2023         Please note that this dictation may have been completed with Dragon, the computer voice recognition software. Quite often unanticipated grammatical, syntax, homophones, and other interpretive errors are inadvertently transcribed by the computer software. Please disregard these errors. Please excuse any errors that have escaped final proofreading.

## 2023-01-15 NOTE — ED NOTES
Patient and spouse updated on admission status. Call bell placed within reach and patient made comfortable in bed.

## 2023-01-16 ENCOUNTER — APPOINTMENT (OUTPATIENT)
Dept: NON INVASIVE DIAGNOSTICS | Age: 68
DRG: 071 | End: 2023-01-16
Attending: STUDENT IN AN ORGANIZED HEALTH CARE EDUCATION/TRAINING PROGRAM
Payer: COMMERCIAL

## 2023-01-16 VITALS
HEART RATE: 106 BPM | SYSTOLIC BLOOD PRESSURE: 124 MMHG | HEIGHT: 76 IN | BODY MASS INDEX: 27.03 KG/M2 | DIASTOLIC BLOOD PRESSURE: 83 MMHG | TEMPERATURE: 98.2 F | OXYGEN SATURATION: 99 % | RESPIRATION RATE: 18 BRPM | WEIGHT: 222 LBS

## 2023-01-16 LAB
ANION GAP SERPL CALC-SCNC: 7 MMOL/L (ref 5–15)
ATRIAL RATE: 288 BPM
BASOPHILS # BLD: 0 K/UL (ref 0–0.1)
BASOPHILS NFR BLD: 0 % (ref 0–1)
BUN SERPL-MCNC: 16 MG/DL (ref 6–20)
BUN/CREAT SERPL: 16 (ref 12–20)
CALCIUM SERPL-MCNC: 8.4 MG/DL (ref 8.5–10.1)
CALCULATED R AXIS, ECG10: 60 DEGREES
CALCULATED T AXIS, ECG11: 34 DEGREES
CHLORIDE SERPL-SCNC: 107 MMOL/L (ref 97–108)
CO2 SERPL-SCNC: 26 MMOL/L (ref 21–32)
CREAT SERPL-MCNC: 1.03 MG/DL (ref 0.7–1.3)
DIAGNOSIS, 93000: NORMAL
DIFFERENTIAL METHOD BLD: ABNORMAL
ECHO AO ROOT DIAM: 3.5 CM
ECHO AO ROOT INDEX: 1.51 CM/M2
ECHO AV PEAK GRADIENT: 5 MMHG
ECHO AV PEAK VELOCITY: 1.1 M/S
ECHO AV VELOCITY RATIO: 0.73
ECHO EST RA PRESSURE: 3 MMHG
ECHO LA DIAMETER INDEX: 1.9 CM/M2
ECHO LA DIAMETER: 4.4 CM
ECHO LA TO AORTIC ROOT RATIO: 1.26
ECHO LA VOL 2C: 73 ML (ref 18–58)
ECHO LA VOL 4C: 143 ML (ref 18–58)
ECHO LA VOLUME AREA LENGTH: 117 ML
ECHO LA VOLUME INDEX A2C: 31 ML/M2 (ref 16–34)
ECHO LA VOLUME INDEX A4C: 62 ML/M2 (ref 16–34)
ECHO LA VOLUME INDEX AREA LENGTH: 50 ML/M2 (ref 16–34)
ECHO LV E' LATERAL VELOCITY: 10 CM/S
ECHO LV E' SEPTAL VELOCITY: 8 CM/S
ECHO LV FRACTIONAL SHORTENING: 25 % (ref 28–44)
ECHO LV INTERNAL DIMENSION DIASTOLE INDEX: 1.72 CM/M2
ECHO LV INTERNAL DIMENSION DIASTOLIC: 4 CM (ref 4.2–5.9)
ECHO LV INTERNAL DIMENSION SYSTOLIC INDEX: 1.29 CM/M2
ECHO LV INTERNAL DIMENSION SYSTOLIC: 3 CM
ECHO LV IVSD: 1.3 CM (ref 0.6–1)
ECHO LV MASS 2D: 186.5 G (ref 88–224)
ECHO LV MASS INDEX 2D: 80.4 G/M2 (ref 49–115)
ECHO LV POSTERIOR WALL DIASTOLIC: 1.3 CM (ref 0.6–1)
ECHO LV RELATIVE WALL THICKNESS RATIO: 0.65
ECHO LVOT PEAK GRADIENT: 3 MMHG
ECHO LVOT PEAK VELOCITY: 0.8 M/S
ECHO MV A VELOCITY: 0.26 M/S
ECHO MV AREA PHT: 3.6 CM2
ECHO MV E DECELERATION TIME (DT): 211.9 MS
ECHO MV E VELOCITY: 0.77 M/S
ECHO MV E/A RATIO: 2.96
ECHO MV E/E' LATERAL: 7.7
ECHO MV E/E' RATIO (AVERAGED): 8.66
ECHO MV E/E' SEPTAL: 9.63
ECHO MV PRESSURE HALF TIME (PHT): 61.5 MS
ECHO MV REGURGITANT PEAK GRADIENT: 121 MMHG
ECHO MV REGURGITANT PEAK VELOCITY: 5.5 M/S
ECHO PV MAX VELOCITY: 1.3 M/S
ECHO PV PEAK GRADIENT: 7 MMHG
ECHO RIGHT VENTRICULAR SYSTOLIC PRESSURE (RVSP): 38 MMHG
ECHO RV TAPSE: 1.9 CM (ref 1.7–?)
ECHO TV REGURGITANT MAX VELOCITY: 2.94 M/S
ECHO TV REGURGITANT PEAK GRADIENT: 35 MMHG
EOSINOPHIL # BLD: 0.2 K/UL (ref 0–0.4)
EOSINOPHIL NFR BLD: 3 % (ref 0–7)
ERYTHROCYTE [DISTWIDTH] IN BLOOD BY AUTOMATED COUNT: 12.1 % (ref 11.5–14.5)
GLUCOSE SERPL-MCNC: 133 MG/DL (ref 65–100)
HCT VFR BLD AUTO: 43.8 % (ref 36.6–50.3)
HGB BLD-MCNC: 14.1 G/DL (ref 12.1–17)
IMM GRANULOCYTES # BLD AUTO: 0 K/UL (ref 0–0.04)
IMM GRANULOCYTES NFR BLD AUTO: 0 % (ref 0–0.5)
LYMPHOCYTES # BLD: 1.6 K/UL (ref 0.8–3.5)
LYMPHOCYTES NFR BLD: 29 % (ref 12–49)
MCH RBC QN AUTO: 29.3 PG (ref 26–34)
MCHC RBC AUTO-ENTMCNC: 32.2 G/DL (ref 30–36.5)
MCV RBC AUTO: 91.1 FL (ref 80–99)
MONOCYTES # BLD: 0.6 K/UL (ref 0–1)
MONOCYTES NFR BLD: 11 % (ref 5–13)
NEUTS SEG # BLD: 3.2 K/UL (ref 1.8–8)
NEUTS SEG NFR BLD: 57 % (ref 32–75)
NRBC # BLD: 0 K/UL (ref 0–0.01)
NRBC BLD-RTO: 0 PER 100 WBC
PLATELET # BLD AUTO: 68 K/UL (ref 150–400)
PMV BLD AUTO: 11.1 FL (ref 8.9–12.9)
POTASSIUM SERPL-SCNC: 4.1 MMOL/L (ref 3.5–5.1)
Q-T INTERVAL, ECG07: 344 MS
QRS DURATION, ECG06: 92 MS
QTC CALCULATION (BEZET), ECG08: 439 MS
RBC # BLD AUTO: 4.81 M/UL (ref 4.1–5.7)
RBC MORPH BLD: ABNORMAL
SODIUM SERPL-SCNC: 140 MMOL/L (ref 136–145)
T3 SERPL-MCNC: 81 NG/DL (ref 71–180)
VENTRICULAR RATE, ECG03: 98 BPM
WBC # BLD AUTO: 5.6 K/UL (ref 4.1–11.1)

## 2023-01-16 PROCEDURE — 36415 COLL VENOUS BLD VENIPUNCTURE: CPT

## 2023-01-16 PROCEDURE — 74011250637 HC RX REV CODE- 250/637: Performed by: FAMILY MEDICINE

## 2023-01-16 PROCEDURE — 85025 COMPLETE CBC W/AUTO DIFF WBC: CPT

## 2023-01-16 PROCEDURE — 93306 TTE W/DOPPLER COMPLETE: CPT

## 2023-01-16 PROCEDURE — 80048 BASIC METABOLIC PNL TOTAL CA: CPT

## 2023-01-16 PROCEDURE — 74011000250 HC RX REV CODE- 250: Performed by: FAMILY MEDICINE

## 2023-01-16 PROCEDURE — 74011250636 HC RX REV CODE- 250/636: Performed by: FAMILY MEDICINE

## 2023-01-16 PROCEDURE — 96372 THER/PROPH/DIAG INJ SC/IM: CPT

## 2023-01-16 PROCEDURE — 94760 N-INVAS EAR/PLS OXIMETRY 1: CPT

## 2023-01-16 PROCEDURE — G0378 HOSPITAL OBSERVATION PER HR: HCPCS

## 2023-01-16 PROCEDURE — 74011250637 HC RX REV CODE- 250/637: Performed by: STUDENT IN AN ORGANIZED HEALTH CARE EDUCATION/TRAINING PROGRAM

## 2023-01-16 RX ORDER — ENOXAPARIN SODIUM 100 MG/ML
1 INJECTION SUBCUTANEOUS
Status: COMPLETED | OUTPATIENT
Start: 2023-01-16 | End: 2023-01-16

## 2023-01-16 RX ORDER — LANOLIN ALCOHOL/MO/W.PET/CERES
100 CREAM (GRAM) TOPICAL DAILY
Qty: 30 TABLET | Refills: 2 | Status: SHIPPED | OUTPATIENT
Start: 2023-01-16 | End: 2023-02-15

## 2023-01-16 RX ADMIN — LEVOTHYROXINE SODIUM 175 MCG: 0.12 TABLET ORAL at 11:40

## 2023-01-16 RX ADMIN — DILTIAZEM HYDROCHLORIDE 30 MG: 30 TABLET, FILM COATED ORAL at 07:05

## 2023-01-16 RX ADMIN — ENOXAPARIN SODIUM 100 MG: 100 INJECTION SUBCUTANEOUS at 11:37

## 2023-01-16 RX ADMIN — SODIUM CHLORIDE, PRESERVATIVE FREE 10 ML: 5 INJECTION INTRAVENOUS at 07:05

## 2023-01-16 RX ADMIN — DILTIAZEM HYDROCHLORIDE 30 MG: 30 TABLET, FILM COATED ORAL at 11:37

## 2023-01-16 NOTE — PROGRESS NOTES
[facility] at [address]  W: [phone]  F: [phone]    Reason for Evaluation:   Boris Fragoso is a 75 y.o. 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 Shreyas Arnold for evaluation of thrombocytopenia and anticoagulation recommendations. 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 male who presents today for evaluation of thrombocytopenia. [facility] initially presented to [facility] and was transferred to [facility] for AMS. In ED, VSS. Labs grossly normal.  Ct head showed no acute intracranial finding. CTA head/neck without stenosis. AMS has since resolved, possibly due to transient global amnesia. Hospital course also notable for atrial flutter. Adalid has a history history of NSGCT s/p radical orchiectomy as per above. Patient on active surveillance with last office visit 10/3/2019 at [facility] by Dr. Regina Villatoro. Patient reports a history of ITP that was diagnosed ~25 years ago after presenting with easy bruising. At that time, he was treated in the [facility] with Winrow and steroids. Per patient, his platelets improved. He notes that he had received a number of vaccines for international travel and the theory was that he may have activated his immune system. He remained in remission until ~10 years ago when he required repeat treatment with steroids.   After that, his platelets remained ~150,000 range. He denies easy bruising, prolonged mucocutaneous bleeding, epistaxis, rash, blood in stool, melena, hematochezia. Social History:  Non-smoker. Active - enjoys tennis and biking. . Review of systems was obtained and pertinent findings reviewed above. Past medical history, social history, family history, medications, and allergies are located in the electronic medical record. Physical Exam:   Visit Vitals  /83   Pulse 70   Temp 98.3 °F (36.8 °C)   Resp 16   Ht 6' 4\" (1.93 m)   Wt 222 lb 0.1 oz (100.7 kg)   SpO2 98%   BMI 27.02 kg/m²     ECOG PS: 0  General: no distress  Eyes: anicteric sclerae  HENT: oropharynx clear, no petechiae  Neck: supple  Lymphatic: no cervical, supraclavicular, or inguinal adenopathy  Respiratory: normal respiratory effort  CV: tachycardic, no peripheral edema  Ext: warm, well perfused, no edema  GI: soft, nontender, nondistended, no masses  Skin: no rashes, no ecchymoses, no petechiae  Neuro: grossly intact    Results:     Lab Results   Component Value Date/Time    WBC 5.6 01/16/2023 01:03 AM    HGB 14.1 01/16/2023 01:03 AM    HCT 43.8 01/16/2023 01:03 AM    PLATELET 68 (L) 38/11/4027 01:03 AM    MCV 91.1 01/16/2023 01:03 AM    ABS. NEUTROPHILS 3.2 01/16/2023 01:03 AM     Lab Results   Component Value Date/Time    Sodium 140 01/16/2023 01:03 AM    Potassium 4.1 01/16/2023 01:03 AM    Chloride 107 01/16/2023 01:03 AM    CO2 26 01/16/2023 01:03 AM    Glucose 133 (H) 01/16/2023 01:03 AM    BUN 16 01/16/2023 01:03 AM    Creatinine 1.03 01/16/2023 01:03 AM    Calcium 8.4 (L) 01/16/2023 01:03 AM    Glucose (POC) 97 01/14/2023 06:21 PM     Lab Results   Component Value Date/Time    Bilirubin, total 0.6 01/14/2023 06:27 PM    ALT (SGPT) 105 (H) 01/14/2023 06:27 PM    Alk.  phosphatase 89 01/14/2023 06:27 PM    Protein, total 7.7 01/14/2023 06:27 PM    Albumin 4.2 01/14/2023 06:27 PM    Globulin 3.5 01/14/2023 06:27 PM     Records from [facility] reviewed and summarized above. Test results above have been reviewed. CBC from 1/26/2017 (Care Everywhere)  Normal platelets (728)     Assessment:     #) Thrombocytopenia, moderate:   Remote history of ITP diagnosed late 1990s, treated with Winrow per patient. Relapsed in ~2010 and treated with steroid burst.  Has reportedly remained in remission since then. Only other available labs from 1/26/2017 show normal blood counts, including platelet count of 664. He now presents with platelets 51-30Z. Cause of etiology is unclear, likely EtOH use (given  on admission) versus ITP. I would recommend broad evaluation with lab work as well as complete abdominal ultrasound to rule out hepatosplenomegaly. Patient wishes to have work up as outpatient. As patient's thrombocytopenia has remained stably within moderate range, I believe that he can safely tolerate anti-coagulation cuba-procedurally to allow for cardioversion. Would recommend weekly platelet counts while on AC to ensure platelets remain >49. #) Atrial flutter w RVR: new diagnosis. Per Cards, patient would benefit from cardioversion but have deferred decision regarding AC until our evaluation. Patient has Cardiology appointment at [facility] for second opinion. #) AMS: now resolved. Management per medicine. #) Hypothyroidism  history of autoimmune thyroiditis: on synthroid. Plan:     Obtain smear, coags, hepatitis serologies, HIV, RISA, and abdominal ultrasound to evaluate for hepatosplenomegaly - this can be done as outpatient  No contra-indications to anti-coagulation at this time. Would recommend weekly platelet counts and would hold AC for platelets <89. Patient plans to follow up with [facility] Cardiology as outpatient for second opinion on a flutter and prefers to keep his care at [facility]. Discussed recommendation for Hematology follow up, above work up, and weekly labs. Contact information provided.   Patient will reach out if he wishes to receive his Hematology follow up at [facility]. Hematology will sign off at this time.       Signed By: Beth Gaines MD

## 2023-01-16 NOTE — DISCHARGE SUMMARY
Discharge Summary       PATIENT ID: Pretty Smith  MRN: [id number]   YOB: 1955    DATE OF ADMISSION: 1/14/2023  6:09 PM    DATE OF DISCHARGE: 1/16/23   PRIMARY CARE PROVIDER: None     ATTENDING PHYSICIAN: MICHELE  DISCHARGING PROVIDER: Feliciano Alexander MD    To contact this individual call [phone] and ask the  to page. If unavailable ask to be transferred the Adult Hospitalist Department. CONSULTATIONS: IP CONSULT TO CARDIOLOGY  IP CONSULT TO HEMATOLOGY    PROCEDURES/SURGERIES: * No surgery found *    ADMISSION SUMMARY AND HOSPITAL COURSE:   50-year-old male with PMHx of hypothyroidism presents emergency department with wife for evaluation of sudden onset confusion 45 minutes prior to arrival.  Patient's wife notes that patient is cognitively normal at baseline. At home he began acting very confused, with short-term memory loss, not knowing basic information like his birthday or that they had just discussed meeting up with her friends for dinner tonight. She feels that his speech is slow and that he is having some word finding difficulty. They note that they drink socially and had 3-4 drinks last night but nothing today. They deny any other illicit substance use    DISCHARGE DIAGNOSES / PLAN:      acute encepahlopathy- resolved- likely a case of transient global amnesia  Brain MRI normal- no CVA.  IV thiamine given and home on PO thiamine with recs for neurology follow up for recurrent episodes      #Atrial flutter  -tachycardia-  started on PO diltiazem- rate improved but stil in aflutter  normal Mg2+ echo - not competed prior to DC  Starting lovenox  -consulted cardiology for eval and tx recs     #Hypothyroidism/ hx of Autoimmune thyroditis  -continue home levothyroxine  -low TSH - normal free t4     Heme/onc- thrombocytopenia; hx ITP; hx of left orchiectomy/testicular cancer  -plt 72- likely due to regular/daily Etoh use  -consult hematology for eval and tx recs  Starting lovenox SQ - reviewed hematology rec for weekly labs and outpatient follow up     Elevated ALT levels- likely related to social Etoh use     Reported in medical records Prostatomegaly-no evidence of  urinary retention    PENDING TEST RESULTS:   At the time of discharge the following test results are still pending: none     ADDITIONAL CARE RECOMMENDATIONS:   You were admitted with an espisode of transient global amnesia - recommend taking daily thiamine supplement and follow up with neurology if you experience any recurrent episodes    Your aflutter still requires treatments of either chemical cardioversion or DC cardioversion in addition to selection of a long term rate control medication and anticoagulation- recommend long acting diltiazem with eliquis - we are discharging you home prior to completion of aflutter treatment at your request so you can make your appointment with Dr Adwoa Eisenberg that you arranged for later this afternoon  Consider outpatient echocardiogram    Recommend minimize/ cut back on social alcohol intake due to low platelets and elevated liver function labs  Get these labs rechecked by your primary care MD in 1-2 weeks  Follow up with hematology for low platelets to rule out ITP     NOTIFY YOUR PHYSICIAN FOR ANY OF THE FOLLOWING:   Fever over 101 degrees for 24 hours. Chest pain, shortness of breath, fever, chills, nausea, vomiting, diarrhea, change in mentation, falling, weakness, bleeding. Severe pain or pain not relieved by medications, as well as any other signs or symptoms that you may have questions about.     FOLLOW UP APPOINTMENTS:    Follow-up Information       Follow up With Specialties Details Why Contact Info    Rohith Billingsley MD Hematology and Oncology Schedule an appointment as soon as possible for a visit for hospital follow up and for weekly checks of your hemoglobin and platelet levels after being started on anticoagulation for afib [address]      Gavino Blue MD Cardio Vascular Surgery, Clinical Cardiac Electrophysiology Physician Go on 1/16/2023 appointment for management of afib [address]/ [place]  [id number] [phone]      None    None (565) Patient stated that they have no PCP               DIET: Cardiac Diet    ACTIVITY: Activity as tolerated    EQUIPMENT needed: none    DISCHARGE MEDICATIONS:  Current Discharge Medication List        START taking these medications    Details   thiamine HCL (B-1) 100 mg tablet Take 1 Tablet by mouth daily for 30 days. Qty: 30 Tablet, Refills: 2  Start date: 1/16/2023, End date: 2/15/2023           CONTINUE these medications which have NOT CHANGED    Details   Synthroid 175 mcg tablet Take 175 mcg by mouth daily.              DISPOSITION:    Home With:   OT  PT  HH  RN       Long term SNF/Inpatient Rehab   x Independent  living    Hospice    Other:       PATIENT CONDITION AT DISCHARGE:     Functional status    Poor     Deconditioned    x Independent      Cognition   x  Lucid     Forgetful     Dementia      Catheters/lines (plus indication)    Malone     PICC     PEG    x None      Code status   x  Full code     DNR      PHYSICAL EXAMINATION AT DISCHARGE:  Patient Vitals for the past 24 hrs:   Temp Pulse Resp BP SpO2   01/16/23 1200 -- (!) 106 -- -- --   01/16/23 1114 -- -- -- 124/83 --   01/16/23 1000 -- 70 -- -- --   01/16/23 0705 98.3 °F (36.8 °C) 80 16 124/83 98 %   01/16/23 0550 -- 69 -- -- --   01/16/23 0509 98.3 °F (36.8 °C) 69 16 125/79 97 %   01/16/23 0352 -- 72 -- -- --   01/16/23 0200 -- 77 -- -- --   01/16/23 0058 98.2 °F (36.8 °C) 92 14 138/84 95 %   01/15/23 2155 -- (!) 139 -- -- --   01/15/23 2141 98.6 °F (37 °C) 90 14 (!) 137/90 100 %   01/15/23 2000 -- (!) 108 -- -- --   01/15/23 1800 -- 92 -- -- --   01/15/23 1600 -- 98 -- -- --   01/15/23 1519 98.7 °F (37.1 °C) 93 16 134/72 100 %   01/15/23 1400 -- 97 -- -- --      General: Alert, cooperative, no distress, appears stated age. HEENT:           normocephalic, EOMI, oral mucosa moist  Neck:               no masses or swelling  Lungs:             Clear to auscultation bilaterally. No Wheezing or Rhonchi. No rales. Chest wall:      No tenderness  No Accessory muscle use. Heart:              IRR, mild tacyhcardia,   Abdomen:        Soft, non-tender. Not distended. Extremities:     No cyanosis. No clubbing,   No edema  Skin:                Not pale. Not Jaundiced  No rashes   Psych:             Not anxious or agitated. Neurologic:      Alert, moves all extremities, answers questions appropriately and responds to commands     Data Review:    Review and/or order of clinical lab test  Review and/or order of tests in the radiology section of CPT  Review and/or order of tests in the medicine section of CPT     CT Results  (Last 48 hours)                    01/14/23 1850   CTA CODE NEURO HEAD AND NECK W CONT Final result     Impression:   No large vessel occlusion or hemodynamically significant stenosis. Narrative:   EXAM: CTA CODE NEURO HEAD AND NECK W CONT       INDICATION: Code Stroke       COMPARISON: None. CONTRAST: 100 mL of Isovue-370. TECHNIQUE:  Unenhanced  images were obtained to localize the volume for   acquisition. Multislice helical axial CT angiography was performed from the   aortic arch to the top of the head during uneventful rapid bolus intravenous   contrast administration. Coronal and sagittal reformations and 3D post   processing was performed. CT dose reduction was achieved through use of a   standardized protocol tailored for this examination and automatic exposure   control for dose modulation. FINDINGS:       DELAYED ENHANCEMENT HEAD CT:    No intra or extra-axial mass or collection. Ventricles are normal in size and   configuration. The basal cisterns are patent. Dural venous sinuses are patent.  No abnormal parenchymal or meningeal   enhancement. Mastoid air cells and paranasal sinuses are clear. CTA NECK:   Great vessels: Normal arch anatomy with the origins patent. Right subclavian artery: Patent   Left subclavian artery: Patent    Right common carotid artery: Patent    Left common carotid artery: Patent    Cervical right internal carotid artery: Patent with no significant stenosis by   NASCET criteria. Cervical left internal carotid artery: Patent with no significant stenosis by   NASCET criteria. Right vertebral artery: Patent   Left vertebral artery: Patent   The lung apices are clear. The thyroid is homogeneous. No cervical   lymphadenopathy. CTA HEAD:   Right cavernous internal carotid artery: Patent   Left cavernous internal carotid artery: Patent   Anterior cerebral arteries: Patent   Anterior communicating artery: Patent   Right middle cerebral artery: Patent   Left middle cerebral artery: Patent   Posterior communicating arteries: Patent   Posterior cerebral arteries: Patent   Basilar artery: Patent   Distal vertebral arteries: Patent   No evidence for intracranial aneurysm or hemodynamically significant stenosis. 01/14/23 1837   CT CODE NEURO HEAD WO CONTRAST Final result     Impression:   No acute process seen        Narrative:   EXAM: CT CODE NEURO HEAD WO CONTRAST       INDICATION: Code Stroke       COMPARISON: None. TECHNIQUE: Unenhanced CT of the head was performed using 5 mm images. Brain and   bone windows were generated. CT dose reduction was achieved through use of a   standardized protocol tailored for this examination and automatic exposure   control for dose modulation. FINDINGS:   The ventricles and sulci are normal in size, shape and configuration and   midline. There is no significant white matter disease. There is no intracranial   hemorrhage, extra-axial collection, mass, mass effect or midline shift. The   basilar cisterns are open.  No acute infarct is identified. The bone windows   demonstrate no abnormalities. The visualized portions of the paranasal sinuses   and mastoid air cells are clear. 1/15/23 EXAM: MRI BRAIN WO CONT     INDICATION: rule out CVA, cancer hx     TECHNIQUE:    Multiplanar multisequence acquisition without contrast of the brain. FINDINGS:  The ventricles are normal in size and position. There is no acute infarct,  hemorrhage, extra-axial fluid collection, or mass effect. There is no cerebellar  tonsillar herniation. Expected arterial flow-voids are present. Minimal high  signal periventricular white matter nonspecific but likely due to small vessel  ischemic disease     The paranasal sinuses, mastoid air cells, and middle ears are clear. The orbital  contents are within normal limits. No significant osseous or scalp lesions are  identified. IMPRESSION     1.  No acute intracranial abnormality     Labs:           Recent Labs     01/15/23  0552 01/14/23  1827   WBC 5.5 7.2   HGB 14.3 14.3   HCT 44.2 42.5   PLT 68* 72*           Recent Labs     01/15/23  0552 01/14/23  1827    141   K 4.5 4.1    105   CO2 30 29   BUN 15 18   CREA 1.15 1.16   * 103*   CA 8.6 8.9   MG 2.2  --           Recent Labs     01/14/23  1827   *   AP 89   TBILI 0.6   TP 7.7   ALB 4.2   GLOB 3.5          Recent Labs     01/14/23  1827   INR 1.1   PTP 10.4        Current Facility-Administered Medications:     sodium chloride (NS) flush 5-40 mL, 5-40 mL, IntraVENous, Q8H, Drew Soler MD, 10 mL at 01/16/23 0705    sodium chloride (NS) flush 5-40 mL, 5-40 mL, IntraVENous, PRN, Drew Soler MD, 10 mL at 01/15/23 1508    acetaminophen (TYLENOL) tablet 650 mg, 650 mg, Oral, Q6H PRN **OR** acetaminophen (TYLENOL) suppository 650 mg, 650 mg, Rectal, Q6H PRN, Drew Soler MD    polyethylene glycol (MIRALAX) packet 17 g, 17 g, Oral, DAILY PRN, Drew Soler MD    ondansetron (ZOFRAN ODT) tablet 4 mg, 4 mg, Oral, Q8H PRN **OR** ondansetron (ZOFRAN) injection 4 mg, 4 mg, IntraVENous, Q6H PRN, Chapincito Owens MD    dilTIAZem IR (CARDIZEM) tablet 30 mg, 30 mg, Oral, AC&HS, Edilma REYES MD, 30 mg at 01/16/23 1137    levothyroxine (SYNTHROID) tablet 175 mcg, 175 mcg, Oral, DAILY, Marian Villalpando MD, 175 mcg at 01/16/23 1140     Medications Administered       dilTIAZem IR (CARDIZEM) tablet 30 mg       Admin Date  01/15/2023 Action  Given Dose  30 mg Route  Oral Administered By  Onelia Santos RN               Admin Date  01/15/2023 Action  Given Dose  30 mg Route  Oral Administered By  Nuha Montemayor RN               Admin Date  01/16/2023 Action  Given Dose  30 mg Route  Oral Administered By  Nuha Montemayor RN               Admin Date  01/16/2023 Action  Given Dose  30 mg Route  Oral Administered By  Bean Kapoor RN              enoxaparin (LOVENOX) injection 100 mg       Admin Date  01/16/2023 Action  Given Dose  100 mg Route  SubCUTAneous Administered By  Bean Kapoor RN              iopamidoL (ISOVUE-370) 370 mg iodine /mL (76 %) injection 100 mL       Admin Date  01/14/2023 Action  Given Dose  100 mL Route  IntraVENous Administered By  Cruz Hill              levothyroxine (SYNTHROID) tablet 175 mcg       Admin Date  01/16/2023 Action  Patient/Family Admin Dose  175 mcg Route  Oral Administered By  Bean Kapoor RN              sodium chloride (NS) flush 5-40 mL       Admin Date  01/15/2023 Action  Given Dose  10 mL Route  IntraVENous Administered By  Nuha Montemayor RN               Admin Date  01/15/2023 Action  Given Dose  10 mL Route  IntraVENous Administered By  Onelia Santos RN               Admin Date  01/15/2023 Action  Given Dose  10 mL Route  IntraVENous Administered By  Onelia Santos RN               Admin Date  01/15/2023 Action  Given Dose  10 mL Route  IntraVENous Administered By  Nuha Montemayor RN               Admin Date  01/16/2023 Action  Given Dose  10 mL Route  IntraVENous Administered By  Myrl Babinski, RN              thiamine (B-1) 500 mg in 0.9% sodium chloride 50 mL IVPB       Admin Date  01/15/2023 Action  New Bag Dose  500 mg Rate  200 mL/hr Route  IntraVENous Administered By  Marnie Chavez RN                     CHRONIC MEDICAL DIAGNOSES:  Problem List as of 1/16/2023 Never Reviewed            Codes Class Noted - Resolved    Atrial flutter ([facility] [address] ICD-10-CM: I29.83  ICD-9-CM: 427.32  1/14/2023 - Present           Greater than 30 minutes were spent with the patient on counseling and coordination of care    Signed:   Elisabeth Salomon MD  1/16/2023  1:03 PM    .

## 2023-01-16 NOTE — DISCHARGE INSTRUCTIONS
You were admitted with an espisode of transient global amnesia - recommend taking daily thiamine supplement and follow up with neurology if you experience any recurrent episodes    Your aflutter still requires treatments of either chemical cardioversion or DC cardioversion in addition to selection of a long term rate control medication and anticoagulation- recommend long acting diltiazem with eliquis - we are discharging you home prior to completion of aflutter treatment at your request so you can make your appointment with Dr Brenna Dawson that you arranged for later this afternoon  Consider outpatient echocardiogram    Recommend minimize/ cut back on social alcohol intake due to low platelets and elevated liver function labs  Get these labs rechecked by your primary care MD in 1-2 weeks  Follow up with hematology for low platelets to rule out ITP

## 2023-01-16 NOTE — PROGRESS NOTES
Cardiology Progress Note  2023    Admit Date: 2023  Admit Diagnosis: Atrial flutter ([facility] [address] [I48.92]  CC:  atrial flutter    Assessment/Recommendations:  Typical Atrial flutter with RVR - rate controlled now on diltiazem  - new diagnosis, unknown duration  - echo on my review showed low normal LVEF 50-55% with LAE, mild MR and TR.   - CHADSVASc score 2 so anticoagulation is generally indicated  - he was evaluated by hematology and ok to anticoagulate  - I think he will benefit from MAURA and cardioversion and eventually ablation. He has requested discharge and will be seeing Dr. Gabrielle Quintero at [facility] later today. Will defer further management to [facility] cardiology       HTN  - continue diltiazem      3. Thrombocytopenia with h/o ITP - hematology following     4. Hypothyroidism - TSH low but FT4 normal.      Hospital problem list     Active Problems:    Atrial flutter ([facility] [address] (2023)                                                          Subjective:     Feeling. He would like to be discharged to follow up at [facility]. Rates controlled on tele. ROS: All other systems reviewed and were negative other than mentioned above. No change in family and social history from H&P/Consult note.     Objective:    Physical Exam:  24 hr VS reviewed, overall VSSAF  Temp (24hrs), Av.4 °F (36.9 °C), Min:98.2 °F (36.8 °C), Max:98.7 °F (37.1 °C)    Patient Vitals for the past 8 hrs:   Pulse   23 1200 (!) 106   23 1000 70   23 0705 80   23 0550 69    Patient Vitals for the past 8 hrs:   Resp   23 0705 16    Patient Vitals for the past 8 hrs:   BP   23 1114 124/83   23 0705 124/83          Intake/Output Summary (Last 24 hours) at 2023 1332  Last data filed at 1/15/2023 1600  Gross per 24 hour   Intake 450 ml   Output 0 ml   Net 450 ml       General: resting comfortably in no distress  HEENT: sclera anicteric, moist mucous membranes  Neck: supple, no JVD   CV: irregular rate and rhythm, no murmurs, rubs or gallops, 2+ radial pulses bilaterally  Lungs: no respiratory distress, lungs clear to auscultation without wheezing or rales  Abdomen: soft, non distended, non tender  MSK: no lower extremity edema  Skin: warm to touch  Neuro: alert and oriented, answered questions appropriately  Psych: normal mood and affect     Data Review:  Lab results reviewed as noted below. Current medications reviewed as noted below. Telemetry independently reviewed : []  sinus    []  chronic afib     [x]  atrial flutter    []  NSVT    ECG independently reviewed: [x]  Typical atrial flutter    []  no significant changes   []  no new ECG provided for review    No results for input(s): PH, PCO2, PO2 in the last 72 hours. No results for input(s): CPK, CKMB in the last 72 hours. No lab exists for component: TROPONINI  Recent Labs     01/16/23  0103 01/15/23  0552 01/14/23 1827    140 141   K 4.1 4.5 4.1    106 105   CO2 26 30 29   BUN 16 15 18   CREA 1.03 1.15 1.16   * 106* 103*   CA 8.4* 8.6 8.9   ALB  --   --  4.2   WBC 5.6 5.5 7.2   HGB 14.1 14.3 14.3   HCT 43.8 44.2 42.5   PLT 68* 68* 72*     Recent Labs     01/14/23 1827   AP 89   TBILI 0.6   TP 7.7   ALB 4.2   GLOB 3.5     Recent Labs     01/14/23 1827   INR 1.1   PTP 10.4      No results for input(s): FE, TIBC, PSAT, FERR in the last 72 hours.    Lab Results   Component Value Date/Time    Glucose (POC) 97 01/14/2023 06:21 PM       Current Facility-Administered Medications   Medication Dose Route Frequency    sodium chloride (NS) flush 5-40 mL  5-40 mL IntraVENous Q8H    sodium chloride (NS) flush 5-40 mL  5-40 mL IntraVENous PRN    acetaminophen (TYLENOL) tablet 650 mg  650 mg Oral Q6H PRN    Or    acetaminophen (TYLENOL) suppository 650 mg  650 mg Rectal Q6H PRN    polyethylene glycol (MIRALAX) packet 17 g  17 g Oral DAILY PRN    ondansetron ([place] ODT) tablet 4 mg  4 mg Oral Q8H PRN    Or    ondansetron (ZOFRAN) injection 4 mg  4 mg IntraVENous Q6H PRN    dilTIAZem IR (CARDIZEM) tablet 30 mg  30 mg Oral AC&HS    levothyroxine (SYNTHROID) tablet 175 mcg  175 mcg Oral DAILY         John Sutton Amend, [address] [facility]  01/16/23

## 2023-01-16 NOTE — CONSULTS
Consult for thrombocytopenia and anticoagulation recs. Patient with a flutter. [facility] to see. Full note to follow.

## 2023-01-17 NOTE — PROGRESS NOTES
Physician Progress Note      PATIENT:               Lila Robles  CSN #:                  [id number]  :                       1955  ADMIT DATE:       2023 6:09 PM  [address] DATE:        2023 1:52 PM  RESPONDING  PROVIDER #:        Vale Garces MD          QUERY TEXT:    Pt admitted with A flutter  and has encephalopathy documented. If possible, please document in progress notes and discharge summary further specificity regarding the type of encephalopathy:        The medical record reflects the following:  Risk Factors: HTN  Clinical Indicators:  1/15 PN  ? acute encephalopathy- resolved- likely a case of transient global amnesia    BP (!) 163/107 (BP 1 Location: Right upper arm, BP Patient Position: Lying)      Treatment: CTA, MRI, carizem    Thank you,  José Antonio Colby RN, CDI, CRCR, CCDS  Certified Clinical Documentation   [phone]  You can also contact me via [facility]. Options provided:  -- transient global amnesia with  encephalopathy ruled out  -- Metabolic encephalopathy  -- Hypertensive encephalopathy  -- Other - I will add my own diagnosis  -- Disagree - Not applicable / Not valid  -- Disagree - Clinically unable to determine / Unknown  -- Refer to Clinical Documentation Reviewer    PROVIDER RESPONSE TEXT:    This patient has transient global amnesia with encephalopathy ruled out.     Query created by: Antony Rhodes on 2023 11:25 AM      Electronically signed by:  Vale Garces MD 2023 8:04 AM

## 2023-05-23 RX ORDER — LEVOTHYROXINE SODIUM 175 UG/1
175 TABLET ORAL DAILY
COMMUNITY
Start: 2022-12-11